# Patient Record
Sex: FEMALE | Race: WHITE | NOT HISPANIC OR LATINO | URBAN - METROPOLITAN AREA
[De-identification: names, ages, dates, MRNs, and addresses within clinical notes are randomized per-mention and may not be internally consistent; named-entity substitution may affect disease eponyms.]

---

## 2024-03-20 ENCOUNTER — HOSPITAL ENCOUNTER (EMERGENCY)
Facility: MEDICAL CENTER | Age: 49
End: 2024-03-20
Attending: EMERGENCY MEDICINE
Payer: OTHER MISCELLANEOUS

## 2024-03-20 VITALS
WEIGHT: 151.01 LBS | DIASTOLIC BLOOD PRESSURE: 90 MMHG | HEIGHT: 64 IN | OXYGEN SATURATION: 94 % | SYSTOLIC BLOOD PRESSURE: 152 MMHG | BODY MASS INDEX: 25.78 KG/M2 | RESPIRATION RATE: 16 BRPM | TEMPERATURE: 97.3 F | HEART RATE: 70 BPM

## 2024-03-20 DIAGNOSIS — A53.9 SYPHILIS (ACQUIRED): ICD-10-CM

## 2024-03-20 DIAGNOSIS — Z20.2 STD EXPOSURE: ICD-10-CM

## 2024-03-20 LAB
C TRACH DNA SPEC QL NAA+PROBE: NEGATIVE
HIV 1+2 AB+HIV1 P24 AG SERPL QL IA: NORMAL
N GONORRHOEA DNA SPEC QL NAA+PROBE: NEGATIVE
RPR SER QL: NON REACTIVE
SPECIMEN SOURCE: NORMAL
T PALLIDUM AB SER QL IA: REACTIVE

## 2024-03-20 PROCEDURE — 99283 EMERGENCY DEPT VISIT LOW MDM: CPT

## 2024-03-20 PROCEDURE — 86780 TREPONEMA PALLIDUM: CPT

## 2024-03-20 PROCEDURE — A9270 NON-COVERED ITEM OR SERVICE: HCPCS | Performed by: EMERGENCY MEDICINE

## 2024-03-20 PROCEDURE — 700102 HCHG RX REV CODE 250 W/ 637 OVERRIDE(OP): Performed by: EMERGENCY MEDICINE

## 2024-03-20 PROCEDURE — 87491 CHLMYD TRACH DNA AMP PROBE: CPT

## 2024-03-20 PROCEDURE — 86592 SYPHILIS TEST NON-TREP QUAL: CPT

## 2024-03-20 PROCEDURE — 36415 COLL VENOUS BLD VENIPUNCTURE: CPT

## 2024-03-20 PROCEDURE — 87389 HIV-1 AG W/HIV-1&-2 AB AG IA: CPT

## 2024-03-20 PROCEDURE — 87591 N.GONORRHOEAE DNA AMP PROB: CPT

## 2024-03-20 RX ORDER — DOXYCYCLINE 100 MG/1
100 TABLET ORAL ONCE
Status: COMPLETED | OUTPATIENT
Start: 2024-03-20 | End: 2024-03-20

## 2024-03-20 RX ORDER — DOXYCYCLINE HYCLATE 100 MG
100 TABLET ORAL 2 TIMES DAILY
Qty: 28 TABLET | Refills: 0 | Status: ACTIVE | OUTPATIENT
Start: 2024-03-20 | End: 2024-04-03

## 2024-03-20 RX ORDER — DOXYCYCLINE HYCLATE 100 MG
100 TABLET ORAL 2 TIMES DAILY
Qty: 28 TABLET | Refills: 0 | Status: ACTIVE | OUTPATIENT
Start: 2024-03-20 | End: 2024-03-20

## 2024-03-20 RX ADMIN — DOXYCYCLINE 100 MG: 100 TABLET, FILM COATED ORAL at 14:15

## 2024-03-20 NOTE — LETTER
3/22/2024               Arley Hood  [address]  [facility] [address]        Dear Parent(s) /Guardian(s):    This letter is sent in regards to your {DAUGHTER OR SON:67629} (MR#[id number]) recent visit to the [facility] Emergency Department on 3/20/2024. During the visit, tests were performed to assist the physician in a medical diagnosis. A review of those tests requires that we notify you of the following:    {HIS/HER:65780} {SITE:48828} culture and sensitivity is POSITIVE for a bacteria called {BACTERIA:49828}. The antibiotic prescribed ({ANTIBIOTICS:81256})  should be active to treat this bacteria. It is important that {SHE/HE:85523} continue taking this antibiotic until it is finished.       Please feel free to contact me at the number below if you have any questions or concerns. Thank you for your cooperation in the matter.    Sincerely,  ED Culture Follow-Up Staff  Larry Guthrie, PharmD    [facility], Emergency Department  [address] [place] [id number] [phone] (ED Culture Line)

## 2024-03-20 NOTE — DISCHARGE INSTRUCTIONS
You were seen in the emerged part with concern for syphilis exposure.  Your syphilis testing has not yet returned but because of the time between your last exposure and the time of testing, you should receive treatment either way.  You are being prescribed an antibiotic that should not interact with your penicillin allergy.  It is twice a day for 2 weeks.    This pill can be irritating to the esophagus.  After you take it, please drink a full glass of water or eat something so it does not become stuck in the esophagus.    It can also make you light sensitive and you should avoid excessive exposure to the sun or tanning beds.    Return to the emergency department or seek medical attention if you develop:  Abdominal pain, vomiting, fevers, new rashes, any other new or concerning findings

## 2024-03-20 NOTE — LETTER
3/25/2024               Arley Hood  [address] [facility] [address]        Dear Arley (MR#[id number])    As we have been unable to contact you by phone, this letter is sent in regards to your, recent visit to the [facility] Emergency Department on 3/20/2024. During the visit, tests were performed to assist the physician in your medical diagnosis. A review of your tests requires that we notify you of the following:    Your culture test was POSITIVE for Syphilis , a sexually transmitted infection. This was treated appropriately in the Emergency Department and with the antibiotics prescribed for you (doxycycline) on discharge. It is important that you continue taking your antibiotic until it is finished..      Based on the above findings it is recommended that you seek testing for the presence of additional sexually transmitted infections, hepatitis, and HIV from the Health Department. Also, it is advised that you inform your sexual partner(s) within the previous 60 days of the above findings and direct them to the Health Department for testing, and to abstain from sexual intercourse seven days after the completion of antibiotic treatment. Should your symptoms progress, it is important that you follow up with your primary care physician, your local urgent care office, or return to the emergency department for further work up in order to prevent long term health issues.      Additionally, patients who are HIV negative and have been diagnosed with a sexually transmitted infection (STI) in the past 6 months are considered for PrEP.  PrEP stands for Pre-Exposure Prophylaxis. It is a once-daily pill regimen that can help you stay HIV-negative. When taken as prescribed, PrEP has been shown to be safe and highly effective against joselin HIV. While PrEP does not protect against other sexually transmitted infections or unwanted pregnancy, it can be paired with condoms and several other prevention strategies for additional  protection. We have a clinic here in [facility] that can help you obtain this medication, if interested please contact the number below.       Thank you for your cooperation in the matter.    Sincerely,  ED Culture Follow-Up Staff  Larry Guthrie, PharmD    [facility], Emergency Department  [address] [place] [id number] [phone] (ED Culture Line)

## 2024-03-20 NOTE — ED TRIAGE NOTES
"Chief Complaint   Patient presents with    Exposure to STD     Pt's significant other is admitted and was diagnosed with syphilis.  Pt is here to get tested and treated for it.       Pt ambulatory from lobby with steady gait. Pt denies any symptoms at this time. STD protocol ordered.     Pt is alert and oriented, speaking in full sentences, follows commands and responds appropriately to questions. Resp are even and unlabored.      Pt placed in lobby. Pt educated on triage process. Pt encouraged to alert staff for any changes.     Patient and staff wearing appropriate PPE.    /88   Pulse 68   Temp 35.8 °C (96.5 °F) (Temporal)   Resp 16   Ht 1.626 m (5' 4\")   Wt 68.5 kg (151 lb 0.2 oz)   SpO2 99%      "

## 2024-03-21 NOTE — DISCHARGE PLANNING
Pt called to inquire about her prescription that was not at the pharmacy when she went to pick it up. Writer confirmed that it was sent to [facility] pharmacy but that [facility] pharmacy was already closed for the day. Writer called Pt back and she stated that the prescription was supposed to go to [facility]. Writer requested the Dr to send it over to [facility], he did so, writer called Pt back and let her know that the prescription had been re-sent to [facility] in [place] now but that they close at 2000 today, in 45 minutes. Pt appreciative of information.

## 2024-03-23 LAB — T PALLIDUM AB SER QL AGGL: REACTIVE

## 2024-03-25 NOTE — ED NOTES
"ED Positive Culture Follow-up/Notification Note:    Date: 3/25/24     Patient seen in the ED on 3/20/2024 for evaluation of significant other diagnosed with syphilis. Per ED provider note, \"He was just treated for it while in the hospital. They have had sexual contact within the last 90 days. She has allergies to Penicillin including lip swelling, per her mother. She had amoxicillin 8 years ago and also experienced lip swelling. She is not currently having any symptoms of skin lesions, burning with urine, abdominal pain, or abnormal discharge.\"  1. STD exposure    2. Syphilis (acquired)       Discharge Medication List as of 3/20/2024  2:24 PM          Allergies: Penicillins     Vitals:    03/20/24 1234 03/20/24 1250 03/20/24 1414 03/20/24 1420   BP: 138/88  (!) 152/90 (!) 152/90   Pulse: 68  69 70   Resp: 16   16   Temp: 35.8 °C (96.5 °F)  36.3 °C (97.3 °F) 36.3 °C (97.3 °F)   TempSrc: Temporal      SpO2: 99%  93% 94%   Weight:  68.5 kg (151 lb 0.2 oz)     Height:  1.626 m (5' 4\")         Final cultures:   Results       Procedure Component Value Units Date/Time    Chlamydia/GC, PCR (Urine) [[id number]] Collected: 03/20/24 1310    Order Status: Completed Specimen: Urine Updated: 03/20/24 2134     C. trachomatis by PCR Negative     Gc By Dna Probe Negative     Source Urine            Plan:   Patient's RPR is negative but treponemal antibody and Mha-Tp are positive indicating prior infection that may have been treated or possible new infection. I tried to call patient to discuss history but left voicemail. Will send patient letter to call back to discuss to determine if patient received sufficient treatment.  Patient given doxycycline 100 mg by mouth twice daily for 28 days which is an alternative to penicillin G benzathine 2.4 million units IM once weekly for 3 weeks for latent syphilis.  Patient reports she has never had syphilis before. Explained that I wanted to call to clarify her history because these results " usually point toward a previous exposure but patient has not been tested before. I explained that regardless she has appropriate treatment with doxycyline but she should follow up with the health department after she's done for confirmation of negative test. Patient understanding and agreeable.  Larry Guthrie, PharmD